# Patient Record
Sex: MALE | Race: BLACK OR AFRICAN AMERICAN | NOT HISPANIC OR LATINO | Employment: FULL TIME | URBAN - METROPOLITAN AREA
[De-identification: names, ages, dates, MRNs, and addresses within clinical notes are randomized per-mention and may not be internally consistent; named-entity substitution may affect disease eponyms.]

---

## 2024-02-07 ENCOUNTER — APPOINTMENT (OUTPATIENT)
Dept: GENERAL RADIOLOGY | Facility: HOSPITAL | Age: 24
End: 2024-02-07
Payer: OTHER MISCELLANEOUS

## 2024-02-07 ENCOUNTER — HOSPITAL ENCOUNTER (EMERGENCY)
Facility: HOSPITAL | Age: 24
Discharge: HOME | End: 2024-02-07
Attending: EMERGENCY MEDICINE | Admitting: EMERGENCY MEDICINE
Payer: OTHER MISCELLANEOUS

## 2024-02-07 VITALS
HEART RATE: 59 BPM | TEMPERATURE: 99.5 F | BODY MASS INDEX: 19.21 KG/M2 | WEIGHT: 141.8 LBS | DIASTOLIC BLOOD PRESSURE: 70 MMHG | HEIGHT: 72 IN | RESPIRATION RATE: 18 BRPM | OXYGEN SATURATION: 100 % | SYSTOLIC BLOOD PRESSURE: 126 MMHG

## 2024-02-07 DIAGNOSIS — Z77.120 MOLD SUSPECTED EXPOSURE: Primary | ICD-10-CM

## 2024-02-07 LAB
FLUAV SUBTYP SPEC NAA+PROBE: NOT DETECTED
FLUBV RNA ISLT QL NAA+PROBE: NOT DETECTED
RSV RNA NPH QL NAA+NON-PROBE: NOT DETECTED
SARS-COV-2 RNA RESP QL NAA+PROBE: NOT DETECTED

## 2024-02-07 PROCEDURE — 71045 X-RAY EXAM CHEST 1 VIEW: CPT

## 2024-02-07 PROCEDURE — 99283 EMERGENCY DEPT VISIT LOW MDM: CPT

## 2024-02-07 PROCEDURE — 87637 SARSCOV2&INF A&B&RSV AMP PRB: CPT

## 2024-02-07 NOTE — Clinical Note
[facility] EMERGENCY ROOM  [address]  [place] [address]  Phone: [phone]    Roland Aguirre was seen and treated in our emergency department on 2/7/2024.  He may return to work on 02/08/2024.         Thank you for choosing [facility].    Mehrdad Keller PA-C

## 2024-02-07 NOTE — ED PROVIDER NOTES
Time: 11:23 AM EST  Date of encounter:  2/7/2024  Independent Historian/Clinical History and Information was obtained by:   Patient    History is limited by: N/A    Chief Complaint: Congestion      History of Present Illness:  Patient is a 23 y.o. year old male who presents to the emergency department for evaluation of nasal congestion and bodyaches that began 2 days ago.  Patient states he works at the [employer] and was told that he may have been exposed to black mold.  Patient denies cough, chest pain, and shortness of air.    HPI    Patient Care Team  Primary Care Provider: Provider, No Known    Past Medical History:     No Known Allergies  History reviewed. No pertinent past medical history.  History reviewed. No pertinent surgical history.  History reviewed. No pertinent family history.    Home Medications:  Prior to Admission medications    Not on File        Social History:   Social History     Tobacco Use    Smoking status: Never    Smokeless tobacco: Never   Vaping Use    Vaping Use: Every day   Substance Use Topics    Alcohol use: Yes     Comment: social    Drug use: Defer         Review of Systems:  Review of Systems   Constitutional:  Negative for chills and fever.   HENT:  Positive for congestion. Negative for rhinorrhea and sore throat.    Eyes:  Negative for pain and visual disturbance.   Respiratory:  Negative for apnea, cough, chest tightness and shortness of breath.    Cardiovascular:  Negative for chest pain and palpitations.   Gastrointestinal:  Negative for abdominal pain, diarrhea, nausea and vomiting.   Genitourinary:  Negative for difficulty urinating and dysuria.   Musculoskeletal:  Positive for myalgias. Negative for joint swelling.   Skin:  Negative for color change.   Neurological:  Negative for seizures and headaches.   Psychiatric/Behavioral: Negative.     All other systems reviewed and are negative.       Physical Exam:  /70 (Patient Position: Sitting)   Pulse 59   Temp 99.5 °F  "(37.5 °C) (Oral)   Resp 18   Ht 182.9 cm (72\")   Wt 64.3 kg (141 lb 12.8 oz)   SpO2 100%   BMI 19.23 kg/m²     Physical Exam  Vitals and nursing note reviewed.   Constitutional:       General: He is not in acute distress.     Appearance: Normal appearance. He is not toxic-appearing.   HENT:      Head: Normocephalic and atraumatic.      Jaw: There is normal jaw occlusion.      Nose: Congestion present.   Eyes:      General: Lids are normal.      Extraocular Movements: Extraocular movements intact.      Conjunctiva/sclera: Conjunctivae normal.      Pupils: Pupils are equal, round, and reactive to light.   Cardiovascular:      Rate and Rhythm: Normal rate and regular rhythm.      Pulses: Normal pulses.      Heart sounds: Normal heart sounds.   Pulmonary:      Effort: Pulmonary effort is normal. No respiratory distress.      Breath sounds: Normal breath sounds. No wheezing or rhonchi.   Abdominal:      General: Abdomen is flat.      Palpations: Abdomen is soft.      Tenderness: There is no abdominal tenderness. There is no guarding or rebound.   Musculoskeletal:         General: Normal range of motion.      Cervical back: Normal range of motion and neck supple.      Right lower leg: No edema.      Left lower leg: No edema.   Skin:     General: Skin is warm and dry.   Neurological:      Mental Status: He is alert and oriented to person, place, and time. Mental status is at baseline.   Psychiatric:         Mood and Affect: Mood normal.                  Procedures:  Procedures      Medical Decision Making:      Comorbidities that affect care:    None    External Notes reviewed:          The following orders were placed and all results were independently analyzed by me:  Orders Placed This Encounter   Procedures    COVID PRE-OP / PRE-PROCEDURE SCREENING ORDER (NO ISOLATION) - Swab, Nasopharynx    COVID-19, FLU A/B, RSV PCR 1 HR TAT - Swab, Nasopharynx    XR Chest 1 View    Ambulatory Referral to Pulmonology "       Medications Given in the Emergency Department:  Medications - No data to display     ED Course:         Labs:    Lab Results (last 24 hours)       Procedure Component Value Units Date/Time    COVID PRE-OP / PRE-PROCEDURE SCREENING ORDER (NO ISOLATION) - Swab, Nasopharynx [[id number]]  (Normal) Collected: 02/07/24 1019    Specimen: Swab from Nasopharynx Updated: 02/07/24 1153    Narrative:      The following orders were created for panel order COVID PRE-OP / PRE-PROCEDURE SCREENING ORDER (NO ISOLATION) - Swab, Nasopharynx.  Procedure                               Abnormality         Status                     ---------                               -----------         ------                     COVID-19, FLU A/B, RSV P...[[id number]]  Normal              Final result                 Please view results for these tests on the individual orders.    COVID-19, FLU A/B, RSV PCR 1 HR TAT - Swab, Nasopharynx [[id number]]  (Normal) Collected: 02/07/24 1019    Specimen: Swab from Nasopharynx Updated: 02/07/24 1153     COVID19 Not Detected     Influenza A PCR Not Detected     Influenza B PCR Not Detected     RSV, PCR Not Detected    Narrative:      Fact sheet for providers: https://www.fda.gov/media/977467/download    Fact sheet for patients: https://www.fda.gov/media/311511/download    Test performed by PCR.             Imaging:    XR Chest 1 View    Result Date: 2/7/2024  PROCEDURE: XR CHEST 1 VW  COMPARISON: None  INDICATIONS: COUGH  FINDINGS:  Scoliosis.  Heart size and pulmonary vasculature within normal limits.  Lungs clear.  Costophrenic angles sharp       No active cardiopulmonary disease       DIEGO HUI MD       Electronically Signed and Approved By: DIEGO HUI MD on 2/07/2024 at 12:04                Differential Diagnosis and Discussion:    Cough: Differential diagnosis includes but is not limited to pneumonia, acute bronchitis, upper respiratory infection, ACE inhibitor use, allergic reaction,  epiglottitis, seasonal allergies, chemical irritants, exercise-induced asthma, viral syndrome.    All labs were reviewed and interpreted by me.  All X-rays impressions were independently interpreted by me.    MDM     Patient tested negative for COVID, influenza, and RSV.  Chest x-ray shows no acute cardiopulmonary abnormality.  Patient's lungs are clear to auscultation bilaterally.  Patient's oxygen saturation was 100% on room air.  Patient cannot provide a sputum culture today.  I will provide an ambulatory referral to pulmonology for follow-up.  Instructed patient to return to the ED if he developed any worsening symptoms.  Patient states he understands and agrees with plan of care.          Patient Care Considerations:          Consultants/Shared Management Plan:        Social Determinants of Health:    Patient is independent, reliable, and has access to care.       Disposition and Care Coordination:    Discharged: The patient is suitable and stable for discharge with no need for consideration of admission.    I have explained the patient´s condition, diagnoses and treatment plan based on the information available to me at this time. I have answered questions and addressed any concerns. The patient has a good  understanding of the patient´s diagnosis, condition, and treatment plan as can be expected at this point. The vital signs have been stable. The patient´s condition is stable and appropriate for discharge from the emergency department.      The patient will pursue further outpatient evaluation with the primary care physician or other designated or consulting physician as outlined in the discharge instructions. They are agreeable to this plan of care and follow-up instructions have been explained in detail. The patient has received these instructions in written format and has expressed an understanding of the discharge instructions. The patient is aware that any significant change in condition or worsening of  symptoms should prompt an immediate return to this or the closest emergency department or call to 911.  I have explained discharge medications and the need for follow up with the patient/caretakers. This was also printed in the discharge instructions. Patient was discharged with the following medications and follow up:      Medication List      No changes were made to your prescriptions during this visit.      Giacomo Robison, DO  [address] [facility]  [address] [place] [id number]  [phone]    Schedule an appointment as soon as possible for a visit in 1 week  Follow-up       Final diagnoses:   Mold suspected exposure        ED Disposition       ED Disposition   Discharge    Condition   Stable    Comment   --               This medical record created using voice recognition software.             Mehrdad Keller PA-C  02/07/24 121

## 2024-09-25 ENCOUNTER — HOSPITAL ENCOUNTER (EMERGENCY)
Facility: HOSPITAL | Age: 24
Discharge: HOME | End: 2024-09-25
Attending: EMERGENCY MEDICINE | Admitting: EMERGENCY MEDICINE
Payer: MEDICAID

## 2024-09-25 VITALS
TEMPERATURE: 98.4 F | SYSTOLIC BLOOD PRESSURE: 142 MMHG | HEIGHT: 72 IN | OXYGEN SATURATION: 99 % | DIASTOLIC BLOOD PRESSURE: 92 MMHG | RESPIRATION RATE: 18 BRPM | BODY MASS INDEX: 20.51 KG/M2 | WEIGHT: 151.46 LBS | HEART RATE: 48 BPM

## 2024-09-25 DIAGNOSIS — K02.9 DENTAL CARIES: ICD-10-CM

## 2024-09-25 DIAGNOSIS — K08.89 PAIN, DENTAL: Primary | ICD-10-CM

## 2024-09-25 PROCEDURE — 99283 EMERGENCY DEPT VISIT LOW MDM: CPT

## 2024-09-25 RX ORDER — CHLORHEXIDINE GLUCONATE ORAL RINSE 1.2 MG/ML
15 SOLUTION DENTAL 4 TIMES DAILY
Qty: 473 ML | Refills: 0 | Status: SHIPPED | OUTPATIENT
Start: 2024-09-25

## 2024-09-25 RX ORDER — TRAMADOL HYDROCHLORIDE 50 MG/1
50 TABLET ORAL ONCE
Status: COMPLETED | OUTPATIENT
Start: 2024-09-25 | End: 2024-09-25

## 2024-09-25 RX ORDER — AMOXICILLIN 875 MG
875 TABLET ORAL 2 TIMES DAILY
Qty: 20 TABLET | Refills: 0 | Status: SHIPPED | OUTPATIENT
Start: 2024-09-25 | End: 2024-10-05

## 2024-09-25 RX ORDER — AMOXICILLIN 875 MG
875 TABLET ORAL ONCE
Status: COMPLETED | OUTPATIENT
Start: 2024-09-25 | End: 2024-09-25

## 2024-09-25 RX ADMIN — AMOXICILLIN 875 MG: 875 TABLET, FILM COATED ORAL at 04:30

## 2024-09-25 RX ADMIN — BENZOCAINE: 200 GEL DENTAL; ORAL; PERIODONTAL at 04:30

## 2024-09-25 RX ADMIN — TRAMADOL HYDROCHLORIDE 50 MG: 50 TABLET, COATED ORAL at 04:30

## 2024-09-25 NOTE — ED PROVIDER NOTES
"Time: 3:58 AM EDT  Date of encounter:  9/25/2024  Independent Historian/Clinical History and Information was obtained by:   Patient    History is limited by: N/A    Chief Complaint: Dental pain      History of Present Illness:  Patient is a 24 y.o. year old male who presents to the emergency department for evaluation of progressively worsening dental pain over the past 2 weeks.  Patient states his whole lower teeth and gums are tender and sore.  Patient states he has been trying to get in with a dentist and nobody has any appointments.  No fever.  No oral swelling.  No nausea vomiting.  No facial swelling.  No difficulty swallowing      Patient Care Team  Primary Care Provider: Provider, No Known    Past Medical History:     No Known Allergies  History reviewed. No pertinent past medical history.  History reviewed. No pertinent surgical history.  History reviewed. No pertinent family history.    Home Medications:  Prior to Admission medications    Not on File        Social History:   Social History     Tobacco Use    Smoking status: Never    Smokeless tobacco: Never   Vaping Use    Vaping status: Every Day    Substances: Nicotine   Substance Use Topics    Alcohol use: Yes     Comment: social    Drug use: Yes     Types: Marijuana     Comment: daily         Review of Systems:  Review of Systems   Constitutional:  Negative for fever.   HENT:  Positive for dental problem. Negative for ear pain and facial swelling.    Gastrointestinal:  Negative for vomiting.   Musculoskeletal:  Negative for neck pain.   Skin:  Negative for rash.   Hematological:  Negative for adenopathy.   All other systems reviewed and are negative.       Physical Exam:  /92 (BP Location: Left arm, Patient Position: Sitting)   Pulse (!) 48   Temp 98.4 °F (36.9 °C) (Oral)   Resp 18   Ht 182.9 cm (72\")   Wt 68.7 kg (151 lb 7.3 oz)   SpO2 99%   BMI 20.54 kg/m²     Physical Exam  Vitals and nursing note reviewed.   Constitutional:       " Appearance: Normal appearance.   HENT:      Head: Atraumatic.      Right Ear: Tympanic membrane, ear canal and external ear normal.      Left Ear: Tympanic membrane, ear canal and external ear normal.      Nose: Nose normal.      Mouth/Throat:      Mouth: Mucous membranes are moist.      Comments: Mild dental decay noticed diffusely.  Some areas of missing dentition.  Mild gingival erythema.  No drainable abscess  Eyes:      Conjunctiva/sclera: Conjunctivae normal.   Pulmonary:      Effort: Pulmonary effort is normal.   Musculoskeletal:         General: Normal range of motion.      Cervical back: Normal range of motion.   Lymphadenopathy:      Cervical: No cervical adenopathy.   Skin:     General: Skin is warm and dry.   Neurological:      Mental Status: He is alert and oriented to person, place, and time.   Psychiatric:         Mood and Affect: Mood normal.         Behavior: Behavior normal.                Medical Decision Making:      Comorbidities that affect care:    None    External Notes reviewed:    Previous ED Note: Patient seen here in the emergency department for suspected mold exposure back in February      The following orders were placed and all results were independently analyzed by me:  No orders of the defined types were placed in this encounter.      Medications Given in the Emergency Department:  Medications   amoxicillin (AMOXIL) tablet 875 mg (has no administration in time range)   benzocaine (HURRICAINE/ORAJEL) 20 % jelly (has no administration in time range)   traMADol (ULTRAM) tablet 50 mg (has no administration in time range)        ED Course:         Labs:    Lab Results (last 24 hours)       ** No results found for the last 24 hours. **             Imaging:    No Radiology Exams Resulted Within Past 24 Hours      Differential Diagnosis and Discussion:    Dental Pain: Differential diagnosis includes but is not limited to dental caries, periodontitis, pericoronitis, peridental abscess,  gingival abscess, apthous stomatitis, allergic stomatitis, acute necrotizing ulcerative gingivitis, herpetic stomatitis.        MDM  Number of Diagnoses or Management Options  Dental caries  Pain, dental  Diagnosis management comments: I have explained the patient´s condition, diagnoses and treatment plan based on the information available to me at this time. I have answered questions and addressed any concerns. The patient has a good  understanding of the patient´s diagnosis, condition, and treatment plan as can be expected at this point. The vital signs have been stable. The patient´s condition is stable and appropriate for discharge from the emergency department.      The patient will pursue further outpatient evaluation with the primary care physician or other designated or consulting physician as outlined in the discharge instructions. They are agreeable to this plan of care and follow-up instructions have been explained in detail. The patient has received these instructions in written format and have expressed an understanding of the discharge instructions. The patient is aware that any significant change in condition or worsening of symptoms should prompt an immediate return to this or the closest emergency department or call to 911.       Amount and/or Complexity of Data Reviewed  Tests in the medicine section of CPT®: ordered and reviewed    Risk of Complications, Morbidity, and/or Mortality  Presenting problems: low  Management options: low    Patient Progress  Patient progress: stable           Patient Care Considerations:    NARCOTICS: I considered prescribing opiate pain medication as an outpatient, however chronic pain can be managed by PCP or dentist.  Infection will be treated and nonnarcotic medications      Consultants/Shared Management Plan:    None    Social Determinants of Health:    Patient is independent, reliable, and has access to care.       Disposition and Care Coordination:    Discharged:  The patient is suitable and stable for discharge with no need for consideration of admission.    I have explained the patient´s condition, diagnoses and treatment plan based on the information available to me at this time. I have answered questions and addressed any concerns. The patient has a good  understanding of the patient´s diagnosis, condition, and treatment plan as can be expected at this point. The vital signs have been stable. The patient´s condition is stable and appropriate for discharge from the emergency department.      The patient will pursue further outpatient evaluation with the primary care physician or other designated or consulting physician as outlined in the discharge instructions. They are agreeable to this plan of care and follow-up instructions have been explained in detail. The patient has received these instructions in written format and has expressed an understanding of the discharge instructions. The patient is aware that any significant change in condition or worsening of symptoms should prompt an immediate return to this or the closest emergency department or call to 911.  I have explained discharge medications and the need for follow up with the patient/caretakers. This was also printed in the discharge instructions. Patient was discharged with the following medications and follow up:      Medication List        New Prescriptions      amoxicillin 875 MG tablet  Commonly known as: AMOXIL  Take 1 tablet by mouth 2 (Two) Times a Day for 10 days.     chlorhexidine 0.12 % solution  Commonly known as: PERIDEX  Apply 15 mL to the mouth or throat 4 (Four) Times a Day.     diclofenac 50 MG EC tablet  Commonly known as: VOLTAREN  Take 1 tablet by mouth 3 (Three) Times a Day As Needed (pain).               Where to Get Your Medications        These medications were sent to [id number] - [place], [place] - [address] AT [facility] [address] & [place] - [phone]  -  [phone] FX  [address] [address]      Phone: [phone]   amoxicillin 875 MG tablet  chlorhexidine 0.12 % solution  diclofenac 50 MG EC tablet      Dentist of choice    Schedule an appointment as soon as possible for a visit          Final diagnoses:   Pain, dental   Dental caries        ED Disposition       ED Disposition   Discharge    Condition   Stable    Comment   --               This medical record created using voice recognition software.             Corrina Cameron, APRN  09/25/24 0418